# Patient Record
Sex: FEMALE | Race: WHITE | URBAN - METROPOLITAN AREA
[De-identification: names, ages, dates, MRNs, and addresses within clinical notes are randomized per-mention and may not be internally consistent; named-entity substitution may affect disease eponyms.]

---

## 2018-10-31 ENCOUNTER — TRANSFERRED RECORDS (OUTPATIENT)
Dept: HEALTH INFORMATION MANAGEMENT | Facility: CLINIC | Age: 43
End: 2018-10-31

## 2018-12-19 ENCOUNTER — PRE VISIT (OUTPATIENT)
Dept: ORTHOPEDICS | Facility: CLINIC | Age: 43
End: 2018-12-19

## 2018-12-19 NOTE — TELEPHONE ENCOUNTER
[facility]   DATE RECEIVED: 12/19/2018   NOTES STATUS DETAILS   OFFICE NOTE from referring provider Care Everywhere    OFFICE NOTE from other specialist Care Everywhere    DISCHARGE SUMMARY from hospital N/A    DISCHARGE REPORT from the ER N/A    OPERATIVE REPORT Care Everywhere    MEDICATION LIST Care Everywhere    IMPLANT RECORD/STICKER N/A    LABS     CBC/DIFF Care Everywhere    CULTURES N/A    INJECTIONS DONE IN RADIOLOGY Care Everywhere    MRI RECIEVED    CT SCAN Received    XRAYS (IMAGES & REPORTS) Received    TUMOR     PATHOLOGY  Slides & report N/A